# Patient Record
Sex: FEMALE | Race: WHITE | NOT HISPANIC OR LATINO | Employment: FULL TIME | URBAN - METROPOLITAN AREA
[De-identification: names, ages, dates, MRNs, and addresses within clinical notes are randomized per-mention and may not be internally consistent; named-entity substitution may affect disease eponyms.]

---

## 2018-02-14 ENCOUNTER — OFFICE VISIT (OUTPATIENT)
Dept: RHEUMATOLOGY | Facility: CLINIC | Age: 43
End: 2018-02-14
Payer: COMMERCIAL

## 2018-02-14 VITALS
BODY MASS INDEX: 24.8 KG/M2 | DIASTOLIC BLOOD PRESSURE: 76 MMHG | SYSTOLIC BLOOD PRESSURE: 113 MMHG | HEART RATE: 117 BPM | WEIGHT: 158 LBS | HEIGHT: 67 IN

## 2018-02-14 DIAGNOSIS — R76.8 POSITIVE ANA (ANTINUCLEAR ANTIBODY): Primary | ICD-10-CM

## 2018-02-14 DIAGNOSIS — G47.09 OTHER INSOMNIA: ICD-10-CM

## 2018-02-14 DIAGNOSIS — R53.82 CHRONIC FATIGUE: ICD-10-CM

## 2018-02-14 PROCEDURE — 99999 PR PBB SHADOW E&M-EST. PATIENT-LVL III: CPT | Mod: PBBFAC,,, | Performed by: INTERNAL MEDICINE

## 2018-02-14 PROCEDURE — 99245 OFF/OP CONSLTJ NEW/EST HI 55: CPT | Mod: S$GLB,,, | Performed by: INTERNAL MEDICINE

## 2018-02-17 NOTE — PROGRESS NOTES
History of present illness: 42-year-old female has a long history of migraine headaches.  They persist until this time.  4 years ago she developed fatigue.  This was of gradual onset.  She states it can occur any time during the day.  She admits to not sleeping well at night.  She tends to be restless at night.  She does not nap during the day.  She has no drop attacks.  As part of the workup she was found to have a positive SINDI.  She was referred to .  He could not confirm the presence of a connective tissue disease and is referred to see me for second opinion.    She has had no unexplained fevers.  She had a rash overlying several DIPs last year.  It lasted 2-3 days.  There was some erythema and scaling.  It did not recurred.  She had no exposure at that time.  She also has occasional rash on the bridge of her nose and behind her years which sounds like seborrhea.  She has no conjunctivitis.  She has lip ulcers but no palatal ulcers.  She complains of dry eye and dry mouth.  She is also had vaginal dryness and dry skin.  She has no Raynaud's phenomena, pleurisy, vaginal discharge or ulcers, chronic or bloody diarrhea.  She has no joint pain or arthritis.  She has no numbness or tingling.  She has no thrombophlebitis.  She is a  4 para 2 with 2 first trimester miscarriages.  She has a maternal aunt with lupus.  Her father had gout.    Systems review:  Gen.: Weight has been stable  Respiratory: No chronic cough or sputum production.  No shortness of breath.  GI: No abdominal pain or peptic ulcer disease.  No liver problems.  : No kidney or bladder problems    Physical examination:  Skin: No rashes  ENT: Adequate tears and saliva.  No conjunctival injection or oral ulcers.  Chest: Clear to auscultation and percussion  Cardiac: No murmurs, gallops, rubs  Abdomen: No organomegaly or masses.  No tenderness to palpation  Extremities: No pedal edema  Musculoskeletal: Full range of motion of all joints.   No synovitis.  No tender areas to palpation.  Neurologic: Normal muscle strength testing  Laboratory: ANAs have been positive ranging from 1:320 to 1:640, homogeneous pattern.  She had one positive RNP antibody but otherwise the SINDI panel has been negative.  She had an elevated CRP at her first visit but it has been normal since then.  She has normal complement studies.  She had negative lupus anticoagulant and anticardiolipin antibody.  She had negative thyroid antibodies.    Assessment:  1.  Long history of migraine headaches  2.  4 year history of fatigue.  Etiology is multifactorial.  I suspect her sleep abnormality is playing a big role  3.  Positive SINDI in a moderately high titer with a negative SINDI panel.  She has symptoms of dry eye and dry mouth but I cannot confirm it at the time of her evaluation.  She may have early primary Sjogren syndrome.  She may have a false positive SINDI related to her family history.  It may be that she will develop a connective tissue disease in the future.    Discussion: I discussed with her my findings and how I cannot confirm the presence of a connective tissue disease.  I have no therapeutic recommendations at this time other than I think she might benefit from a sleep study.  I feel improving her sleep may be the best thing to do to try to improve her fatigue, which is her main problem.  I did not give her regular return appointment but would be happy to see her in follow-up if necessary.